# Patient Record
Sex: MALE | Race: WHITE | Employment: OTHER | URBAN - METROPOLITAN AREA
[De-identification: names, ages, dates, MRNs, and addresses within clinical notes are randomized per-mention and may not be internally consistent; named-entity substitution may affect disease eponyms.]

---

## 2023-11-15 LAB
ALANINE AMINOTRANSFERASE (SGPT) (U/L) IN SER/PLAS EXTERNAL: 26 U/L
ALBUMIN (G/DL) IN SER/PLAS EXTERNAL: 4.5 G/DL
ALKALINE PHOSPHATASE (U/L) IN SER/PLAS EXTERNAL: 73 U/L
ASPARTATE AMINOTRANSFERASE (SGOT) (U/L) IN SER/PLAS EXTERNAL: 19 U/L
BILIRUBIN TOTAL (MG/DL) IN SER/PLAS EXTERNAL: 0.7 MG/DL
CALCIUM (MG/DL) IN SER/PLAS EXTERNAL: 9.6 MG/DL
CARBON DIOXIDE, TOTAL (MMOL/L) IN SER/PLAS EXTERNAL: 28 MMOL/L
CHLORIDE (MMOL/L) IN SER/PLAS EXTERNAL: 103 MMOL/L
CHOLESTEROL (MG/DL) IN SER/PLAS EXTERNAL: 256 MG/DL
CHOLESTEROL IN HDL (MG/DL) IN SER/PLAS EXTERNAL: 73 MG/DL
CHOLESTEROL IN LDL (MG/DL) IN SERUM OR PLASMA BY CALCULATION EXTERNAL: 163 MG/DL
CHOLESTEROL/HDL RATIO EXTERNAL: 3.5
CREATININE (MG/DL) IN SER/PLAS EXTERNAL: 1.07 MG/DL
GLOMERULAR FILTRATION RATE ML/MIN/1.73 SQ M.PREDICTED EXTERNAL: 75 ML/MIN/1.73M*2
GLUCOSE (MG/DL) IN SER/PLAS EXTERNAL: 95 MG/DL
NON HDL CHOLESTEROL EXTERNAL: 183 MG/DL
POTASSIUM (MMOL/L) IN SER/PLAS EXTERMA;: 4 MMOL/L
PROSTATE SPECIFIC AG (NG/ML) IN SER/PLAS EXTERNAL: 2.26 NG/ML
PROTEIN TOTAL EXTERNAL: 6.7 G/DL
SODIUM (MMOL/L) IN SER/PLAS EXTERNAL: 140 MMOL/L
THYROTROPIN (MIU/L) IN SER/PLAS BY DETECTION LIMIT <= 0.05 MIU/L EXTERNAL: 2.15 MIU/L
TRIGLYCERIDE (MG/DL) IN SER/PLAS EXTERNAL: 92 MG/DL
UREA NITROGEN (MG/DL) IN SER/PLAS EXTERNAL: 15 MG/DL

## 2023-11-27 PROBLEM — G56.20 ULNAR NEUROPATHY AT ELBOW: Status: ACTIVE | Noted: 2023-11-27

## 2023-11-27 PROBLEM — N20.0 NEPHROLITHIASIS: Status: ACTIVE | Noted: 2023-11-27

## 2023-11-27 PROBLEM — E78.5 HYPERLIPIDEMIA: Status: ACTIVE | Noted: 2023-11-27

## 2023-11-27 PROBLEM — H61.20 IMPACTED CERUMEN: Status: ACTIVE | Noted: 2023-11-27

## 2023-11-27 PROBLEM — H90.3 ASYMMETRIC SNHL (SENSORINEURAL HEARING LOSS): Status: ACTIVE | Noted: 2023-11-27

## 2023-11-27 PROBLEM — H93.12 TINNITUS, LEFT: Status: ACTIVE | Noted: 2023-11-27

## 2023-11-27 PROBLEM — K21.9 GASTROESOPHAGEAL REFLUX DISEASE: Status: ACTIVE | Noted: 2023-11-27

## 2023-11-27 PROBLEM — M72.0 DUPUYTREN CONTRACTURE: Status: ACTIVE | Noted: 2023-11-27

## 2023-11-27 PROBLEM — Z86.69 HISTORY OF MIGRAINE: Status: ACTIVE | Noted: 2023-11-27

## 2023-11-27 PROBLEM — L71.9 ROSACEA: Status: ACTIVE | Noted: 2023-11-27

## 2023-11-27 PROBLEM — R25.1 TREMOR: Status: ACTIVE | Noted: 2023-11-27

## 2023-11-27 PROBLEM — N40.0 BENIGN PROSTATIC HYPERPLASIA: Status: ACTIVE | Noted: 2023-11-27

## 2023-11-27 PROBLEM — H61.23 BILATERAL IMPACTED CERUMEN: Status: ACTIVE | Noted: 2023-11-27

## 2023-11-27 PROBLEM — G47.00 INSOMNIA: Status: ACTIVE | Noted: 2023-11-27

## 2023-11-27 PROBLEM — R91.8 LUNG NODULES: Status: ACTIVE | Noted: 2023-11-27

## 2023-11-27 PROBLEM — R03.0 PREHYPERTENSION: Status: ACTIVE | Noted: 2023-11-27

## 2023-11-27 PROBLEM — B00.9 HSV (HERPES SIMPLEX VIRUS) INFECTION: Status: ACTIVE | Noted: 2023-11-27

## 2023-11-27 RX ORDER — SOD SULF/POT CHLORIDE/MAG SULF 1.479 G
TABLET ORAL
COMMUNITY
Start: 2023-01-05 | End: 2023-12-05 | Stop reason: ALTCHOICE

## 2023-11-27 RX ORDER — NEOMYCIN SULFATE, POLYMYXIN B SULFATE AND DEXAMETHASONE 3.5; 10000; 1 MG/ML; [USP'U]/ML; MG/ML
SUSPENSION/ DROPS OPHTHALMIC
COMMUNITY
Start: 2023-01-06 | End: 2023-12-06 | Stop reason: ALTCHOICE

## 2023-11-27 RX ORDER — VALACYCLOVIR HYDROCHLORIDE 1 G/1
TABLET, FILM COATED ORAL
COMMUNITY
Start: 2014-02-10 | End: 2023-12-06 | Stop reason: SDUPTHER

## 2023-12-01 NOTE — PROGRESS NOTES
Subjective   Patient ID: Jose Stephenson is a 68 y.o. male who presents for No chief complaint on file..  HPI-year-old male is being seen back in the office for recheck of his ears and hearing. Past evaluations have shown evidence for high-frequency hearing loss mostly moderate on the right side in the mid to upper frequency hearing loss on the left ear. There is asymmetry in the 2 areas left lower than right. Hearing has been monitored for any changes in the asymmetry. No imaging studies have been done to date. He does periodically use Debrox for ear cleaning.  He did in September have 1 episode of vertigo in the morning as he rolled out of bed quickly develops instability that was strong leading to some vomiting.  This in the past without recurrences.  There is no evidence for hearing change during the event and has had no problems with that since or before that event.     Objective   Physical Exam  EXAMINATION:     GENERAL TRINITY.EARANCE: Alert, in no acute distress, normal pitch and clarity of voice, well-developed and nourished, cooperative.     HEAD/FACE: Normocephalic, atraumatic, normal facial movements and strength, no no tenderness to palpation, no lesions noted.     SKIN: Normal turgor, no raised or ulcerative lesions, warm and dry to palpation.     EYES: Extraocular motions intact, no nystagmus noted, pupils equal and reactive to light and accommodation, no conjunctivitis.     EARS: Both ears--external ear anatomy is normal without lesions, auditory canals are patent and without skin abrasions or lesions  ceruminous debris is removed with a wax loop bilaterally.  Hearing is intact to voice, tympanic membranes are intact with no acute inflammation, light reflexes present, no effusions are noted and no mastoid tenderness found to palpation.     NOSE: No external skin lesions are noted, nares are patent, septum is intact and deviated to the left, no signs of intranasal inflammation is noted,, sinuses are  nontender to palpation bilaterally, nasal valve is normal.     OROPHARYNX/ORAL CAVITY: Oropharynx is not inflamed and is without lesions, mucosa of the oral cavity is intact and without lesions, tongue is midline and mobile, no acute dental disease is noted, TMJs are mobile however there is a need for lateral movement of his jaw to overcome a left TMJ obstruction., that is post tonsillectomy     NECK: No lymphadenopathy is palpated, carotid pulses are intact, neck is supple with full range of motion, no thyroid abnormalities are noted, trachea is midline, no neck masses are palpated.     LYMPHATICS: No cervical adenopathy or supraclavicular adenopathy is palpated.     NEUROLOGIC/PSYCH; alert and oriented, cranial nerves are grossly intact, gait is without falling, no motor deficits are noted      His audiogram today continues to show on the left-hand side a mid to high-frequency hearing loss mild to still moderately severe at 3 to 4000 Hz back to moderate in the upper frequencies of sound slight change noted.  Discrimination score was still 96% at a 60 dB presentation level increased volume needed to improve discrimination.  The right side continues also show a mid moderate hearing loss mild at 8000 Hz with 96% discrimination at 60 dB as well.  Subtle changes were noted.  Patient ID: Jose Stephenson is a 68 y.o. male.    Ear cerumen removal    Date/Time: 12/5/2023 8:46 AM    Performed by: Shiv Blair DMD, MD  Authorized by: Shiv Blair DMD, MD    Consent:     Consent obtained:  Verbal    Consent given by:  Patient    Risks, benefits, and alternatives were discussed: yes      Risks discussed:  Incomplete removal, pain, infection, dizziness and bleeding    Alternatives discussed:  Observation  Comments:        Procedure Ear Cleaning    Consent:  The planned procedure including the risks as well as alternatives of treatments were discussed and verbal consent obtained.    Procedure: Using otoscopic  techniques, cerumen is removed with a wax loop from both ear canals.    Findings:  The external auditory canals are without inflammation or lesions and both tympanic members are normal in appearance with no evidence for middle ear effusion or signs of infection. No mastoid tenderness is noted. The patient tolerated the procedure well.    Assessment/Plan   Problem List Items Addressed This Visit             ICD-10-CM    Asymmetric SNHL (sensorineural hearing loss) - Primary H90.3    Bilateral impacted cerumen H61.23    Gastroesophageal reflux disease K21.9    Tinnitus, left H93.12    History of migraine Z86.69     I discussed the findings with the patient. Do to the history of cerumen impactions, avoidance of Q tip use and water contamination is advised. Periodic check ups in the office or with the PCP are advised and if recurrent obstructions are noted a scheduled cleaning schedule will be maintained. Ear pain, otorhea, changes in hearing should be reported to the office. For some the use of debrox or baby oil can be helpful as long as sheron TM is intact and not perforated.    I discussed the present hearing test findings with the patient. Since the last test there has been no significant change in the hearing of individual frequencies sound. Discrimination ability remains basically unchanged. It would be advised that a yearly audiogram be done unless symptoms develop in regards to progressive loss, new onset vertigo, or changes regarding tinnitus. Avoidance of loud noise without ear protection is advised. Rehabilitation using hearing aids is advised.  If he wishes the services of the audiologist here he can contact the office and a hearing evaluation will be set up.  If he goes elsewhere he should also plan on a yearly exam to check on hearing along with follow-up with his chosen audiologist.

## 2023-12-05 ENCOUNTER — CLINICAL SUPPORT (OUTPATIENT)
Dept: AUDIOLOGY | Facility: CLINIC | Age: 68
End: 2023-12-05
Payer: COMMERCIAL

## 2023-12-05 ENCOUNTER — OFFICE VISIT (OUTPATIENT)
Dept: OTOLARYNGOLOGY | Facility: CLINIC | Age: 68
End: 2023-12-05
Payer: COMMERCIAL

## 2023-12-05 VITALS — BODY MASS INDEX: 24.5 KG/M2 | WEIGHT: 175 LBS | HEIGHT: 71 IN

## 2023-12-05 DIAGNOSIS — H90.3 SENSORINEURAL HEARING LOSS, BILATERAL: Primary | ICD-10-CM

## 2023-12-05 DIAGNOSIS — H93.12 TINNITUS, LEFT: ICD-10-CM

## 2023-12-05 DIAGNOSIS — H61.23 BILATERAL IMPACTED CERUMEN: ICD-10-CM

## 2023-12-05 DIAGNOSIS — H93.13 TINNITUS, BILATERAL: ICD-10-CM

## 2023-12-05 DIAGNOSIS — K21.9 GASTROESOPHAGEAL REFLUX DISEASE WITHOUT ESOPHAGITIS: ICD-10-CM

## 2023-12-05 DIAGNOSIS — H90.3 ASYMMETRIC SNHL (SENSORINEURAL HEARING LOSS): Primary | ICD-10-CM

## 2023-12-05 DIAGNOSIS — Z86.69 HISTORY OF MIGRAINE: ICD-10-CM

## 2023-12-05 PROCEDURE — 92557 COMPREHENSIVE HEARING TEST: CPT | Performed by: AUDIOLOGIST

## 2023-12-05 PROCEDURE — 69210 REMOVE IMPACTED EAR WAX UNI: CPT | Performed by: OTOLARYNGOLOGY

## 2023-12-05 PROCEDURE — 1159F MED LIST DOCD IN RCRD: CPT | Performed by: OTOLARYNGOLOGY

## 2023-12-05 PROCEDURE — 1126F AMNT PAIN NOTED NONE PRSNT: CPT | Performed by: OTOLARYNGOLOGY

## 2023-12-05 PROCEDURE — 1036F TOBACCO NON-USER: CPT | Performed by: OTOLARYNGOLOGY

## 2023-12-05 PROCEDURE — 1160F RVW MEDS BY RX/DR IN RCRD: CPT | Performed by: OTOLARYNGOLOGY

## 2023-12-05 PROCEDURE — 99214 OFFICE O/P EST MOD 30 MIN: CPT | Performed by: OTOLARYNGOLOGY

## 2023-12-05 RX ORDER — TRETINOIN 0.25 MG/G
CREAM TOPICAL
COMMUNITY
Start: 2022-03-07

## 2023-12-05 RX ORDER — METRONIDAZOLE 7.5 MG/G
GEL TOPICAL
COMMUNITY
Start: 2023-09-20

## 2023-12-05 NOTE — PROGRESS NOTES
COMPREHENSIVE AUDIOMETRIC EVALUATION      Name:  Jose Stephenson  :  1955  Age:  68 y.o.  Date of Evaluation:  23   Referring Provider:  Dr. Blair     History:  Mr. Stephenson was seen today for an evaluation of hearing.  Please note, patient has a known sensorineural hearing loss, bilaterally, most recently documented 2022. Patient reported increased difficulty with higher pitches, women's voices, and in the presence of background noise. Patient additionally reported tinnitus, bilaterally, greater in the left ear than the right ear.  When asked, patient denied otalgia and aural fullness.    See audiometric evaluation at end of this report or scanned under media tab    OTOSCOPY:       Right Ear: Clear canal       Left Ear: Clear canal    AUDIOMETRIC EVALUATION (Phones):       Right Ear: Within normal limits through 1500 Hz sloping to Moderately severe, Sensorineural hearing loss                 Left Ear: Within normal limits through 1000 Hz sloping to Moderately severe, Sensorineural hearing loss         NOTE: Hearing sensitivity essentially consistent with previous audiometric evaluation, however slight changes in hearing sensitivity over time.      Test technique:  Standard Audiometry  Reliability:   good    SPEECH RECOGNITION THRESHOLD:       Right Ear:  5 dBHL in good agreement with PTA       Left Ear:  15 dBHL in good agreement with PTA    WORD RECOGNITION:       Right Ear:  excellent (96%) at elevated presentation level       Left Ear:  excellent (96%) at normal presentation level    NOTE: R WRS repeated for best patient performance (84% at 50 dB HL)    DISCUSSION:   Discussed results and recommendations with patient.  Questions were addressed and the patient was encouraged to contact our department should concerns arise.    RECOMMENDATIONS:  -Recommend patient move forward with hearing aids, bilaterally, for increased signal to noise ratio, clarity of speech, and as possible tinnitus  management.  -Recommend patient return as medically indicated or should concerns for changes in hearing sensitivity arise.    Iona Rudd, CCC-A     Appt: 8:30 - 9:00 AM

## 2023-12-06 ENCOUNTER — OFFICE VISIT (OUTPATIENT)
Dept: PRIMARY CARE | Facility: CLINIC | Age: 68
End: 2023-12-06
Payer: COMMERCIAL

## 2023-12-06 VITALS
DIASTOLIC BLOOD PRESSURE: 80 MMHG | WEIGHT: 182 LBS | TEMPERATURE: 97.1 F | BODY MASS INDEX: 25.48 KG/M2 | HEART RATE: 62 BPM | OXYGEN SATURATION: 100 % | HEIGHT: 71 IN | SYSTOLIC BLOOD PRESSURE: 130 MMHG

## 2023-12-06 DIAGNOSIS — K21.9 GASTROESOPHAGEAL REFLUX DISEASE WITHOUT ESOPHAGITIS: ICD-10-CM

## 2023-12-06 DIAGNOSIS — L71.9 ROSACEA: ICD-10-CM

## 2023-12-06 DIAGNOSIS — G47.00 INSOMNIA, UNSPECIFIED TYPE: ICD-10-CM

## 2023-12-06 DIAGNOSIS — Z13.29 THYROID DISORDER SCREEN: ICD-10-CM

## 2023-12-06 DIAGNOSIS — E78.5 HYPERLIPIDEMIA, UNSPECIFIED HYPERLIPIDEMIA TYPE: ICD-10-CM

## 2023-12-06 DIAGNOSIS — Z12.5 SCREENING PSA (PROSTATE SPECIFIC ANTIGEN): ICD-10-CM

## 2023-12-06 DIAGNOSIS — Z00.00 WELLNESS EXAMINATION: Primary | ICD-10-CM

## 2023-12-06 DIAGNOSIS — B00.9 HSV (HERPES SIMPLEX VIRUS) INFECTION: ICD-10-CM

## 2023-12-06 DIAGNOSIS — R03.0 PREHYPERTENSION: ICD-10-CM

## 2023-12-06 DIAGNOSIS — N40.0 BENIGN PROSTATIC HYPERPLASIA, UNSPECIFIED WHETHER LOWER URINARY TRACT SYMPTOMS PRESENT: ICD-10-CM

## 2023-12-06 DIAGNOSIS — R25.1 TREMOR: ICD-10-CM

## 2023-12-06 PROCEDURE — 99397 PER PM REEVAL EST PAT 65+ YR: CPT | Performed by: NURSE PRACTITIONER

## 2023-12-06 PROCEDURE — 1160F RVW MEDS BY RX/DR IN RCRD: CPT | Performed by: NURSE PRACTITIONER

## 2023-12-06 PROCEDURE — 1126F AMNT PAIN NOTED NONE PRSNT: CPT | Performed by: NURSE PRACTITIONER

## 2023-12-06 PROCEDURE — 1159F MED LIST DOCD IN RCRD: CPT | Performed by: NURSE PRACTITIONER

## 2023-12-06 PROCEDURE — 1036F TOBACCO NON-USER: CPT | Performed by: NURSE PRACTITIONER

## 2023-12-06 RX ORDER — CYCLOSPORINE 0.5 MG/ML
1 EMULSION OPHTHALMIC
COMMUNITY

## 2023-12-06 RX ORDER — VALACYCLOVIR HYDROCHLORIDE 1 G/1
1000 TABLET, FILM COATED ORAL DAILY
Qty: 3 TABLET | Refills: 2 | Status: SHIPPED | OUTPATIENT
Start: 2023-12-06

## 2023-12-06 ASSESSMENT — ENCOUNTER SYMPTOMS
MUSCULOSKELETAL NEGATIVE: 1
TREMORS: 1
HEMATOLOGIC/LYMPHATIC NEGATIVE: 1
SLEEP DISTURBANCE: 1
RESPIRATORY NEGATIVE: 1
CARDIOVASCULAR NEGATIVE: 1
CONSTITUTIONAL NEGATIVE: 1
ALLERGIC/IMMUNOLOGIC NEGATIVE: 1

## 2023-12-06 NOTE — PROGRESS NOTES
"Subjective   Patient ID: Jose Stephenson is a 68 y.o. male who presents for Annual Exam.    HPI   Patient of Dr Nieves here for yearly visit-wellness exam. Last seen on 11/17/2022.  Chronic concerns: GERD, HSV, Hyperlipidemia, Insomnia, BPH, Rosacea, Lung nodule, Tremor  Quit taking statins few years ago d/t reading convinced   Specialist  - Hand surgeon   - Dermatologist-  SCC on right arm.   - Audiology- likely hearing loss increased- hearing aids.  - ENT  - Chiropractor  - Urology- no longer seeing  Labs- [facility] 11/15/2023 copy was faxed to office  NON SMOKER  Exercise 3 x week strength training. Walks 20 minutes twice a week.     Review of Systems   Constitutional: Negative.    HENT: Negative.     Eyes:         Eye exam yearly, Lasik surgery    Respiratory: Negative.     Cardiovascular: Negative.    Gastrointestinal:         Acid reflex ongoing- Gaviscon as needed   Genitourinary: Negative.    Musculoskeletal: Negative.    Allergic/Immunologic: Negative.    Neurological:  Positive for tremors.        Intention tremor in left hand- had EMG completed. Neurologist checked out and benign condition.     Hematological: Negative.    Psychiatric/Behavioral:  Positive for sleep disturbance.      Objective   /80   Pulse 62   Temp 36.2 °C (97.1 °F)   Ht 1.803 m (5' 11\")   Wt 82.6 kg (182 lb)   SpO2 100%   BMI 25.38 kg/m²   Weight up per patient- 177 lbs this morning 12 lbs up over the last year.     Physical Exam  Vitals reviewed.   Constitutional:       Appearance: Normal appearance.   HENT:      Mouth/Throat:      Mouth: Mucous membranes are moist.   Eyes:      Extraocular Movements: Extraocular movements intact.      Pupils: Pupils are equal, round, and reactive to light.   Neck:      Vascular: No carotid bruit.   Cardiovascular:      Rate and Rhythm: Normal rate and regular rhythm.      Heart sounds: Normal heart sounds.   Pulmonary:      Breath sounds: Normal breath sounds.   Abdominal:      General: Bowel " sounds are normal.      Palpations: Abdomen is soft.   Musculoskeletal:         General: Normal range of motion.      Cervical back: Normal range of motion and neck supple.   Skin:     General: Skin is warm.   Neurological:      General: No focal deficit present.   Psychiatric:         Mood and Affect: Mood normal.         Behavior: Behavior normal.         Judgment: Judgment normal.       Assessment/Plan   Health Maintenance  Labs 11/15/2023 ([facility]) reviewed with patient, all questions answered  Tdap/Td- Plans to get High dose at pharmacy  Influenza- Unknown   Prevnar 13/20- Up to date  Shingrix Zoster 12/30/2012- Reports received Sh[facility]rix at [facility]  Colonoscopy 04/17/2023. Endoscopy few years ago, no concerns.   PSA  11/15/2023 (2.26)   Diagnoses and all orders for this visit:  Wellness examination  -     Comprehensive Metabolic Panel; Future  -     CBC; Future  Gastroesophageal reflux disease without esophagitis  -     CBC; Future  Insomnia, unspecified type  Prehypertension  -     Comprehensive Metabolic Panel; Future  -     CBC; Future  Tremor  HSV (herpes simplex virus) infection  Rarely needs to use   - Refilled  valACYclovir (Valtrex) 1 gram tablet; Take 1 tablet (1,000 mg) by mouth once daily.  Rosacea- sees dermatology   Hyperlipidemia, unspecified hyperlipidemia type  -     Lipid Panel; Future  Benign prostatic hyperplasia, unspecified whether lower urinary tract symptoms present  Thyroid disorder screen  -     TSH with reflex to Free T4 if abnormal; Future  Screening PSA (prostate specific antigen)  -     PSA; Future    PLAN: follow up yearly for wellness  Print labs for next appt in one year ([facility])

## 2024-11-18 LAB
ALANINE AMINOTRANSFERASE (SGPT) (U/L) IN SER/PLAS EXTERNAL: 21 U/L
ALBUMIN (G/DL) IN SER/PLAS EXTERNAL: 4.6 G/DL
ALKALINE PHOSPHATASE (U/L) IN SER/PLAS EXTERNAL: 56 U/L
ASPARTATE AMINOTRANSFERASE (SGOT) (U/L) IN SER/PLAS EXTERNAL: 19 U/L
BILIRUBIN TOTAL (MG/DL) IN SER/PLAS EXTERNAL: 0.6 MG/DL
CALCIUM (MG/DL) IN SER/PLAS EXTERNAL: 9.9 MG/DL
CARBON DIOXIDE, TOTAL (MMOL/L) IN SER/PLAS EXTERNAL: 26 MMOL/L
CHLORIDE (MMOL/L) IN SER/PLAS EXTERNAL: 104 MMOL/L
CHOLESTEROL (MG/DL) IN SER/PLAS EXTERNAL: 289 MG/DL
CHOLESTEROL IN HDL (MG/DL) IN SER/PLAS EXTERNAL: 62 MG/DL
CHOLESTEROL IN LDL (MG/DL) IN SERUM OR PLASMA BY CALCULATION EXTERNAL: 206 MG/DL
CHOLESTEROL/HDL RATIO EXTERNAL: 4.7
CREATININE (MG/DL) IN SER/PLAS EXTERNAL: 1.04 MG/DL
ERYTHROCYTE DISTRIBUTION WIDTH (RATIO) BY AUTOMATED COUNT EXTERNAL: 12.6 %
ERYTHROCYTE MEAN CORPUSCULAR HEMOGLOBIN (PG) BY AUTOMATED COUNT EXTERNAL: 31.4 PG
ERYTHROCYTE MEAN CORPUSCULAR HGB CONCENTRATION (G/DL) BY AUTOMATED EXT: 33.8 G/DL
ERYTHROCYTE MEAN CORPUSCULAR VOLUME (FL) BY AUTOMATED COUNT EXTERNAL: 93 FL
ERYTHROCYTES (10*6/UL) IN BLOOD BY AUTOMATED COUNT EXTERNAL: 4.77 X10*6/UL
GLOMERULAR FILTRATION RATE ML/MIN/1.73 SQ M.PREDICTED EXTERNAL: 77 ML/MIN/1.73M*2
GLUCOSE (MG/DL) IN SER/PLAS EXTERNAL: 102 MG/DL
HEMATOCRIT (%) IN BLOOD BY AUTOMATED COUNT EXTERNAL: 44.4 %
HEMOGLOBIN (G/DL) IN BLOOD EXTERNAL: 15 G/DL
LEUKOCYTES (10*3/UL) IN BLOOD BY AUTOMATED COUNT EXTERNAL: 6.4 X10*3/UL
NON HDL CHOLESTEROL EXTERNAL: 227 MG/DL
PLATELET MEAN VOLUME (FL) IN BLOOD BY AUTOMATED COUNT EXTERNAL: 9.3 FL
PLATELETS (10*3/UL) IN BLOOD AUTOMATED COUNT EXTERNAL: 252 X10*3/UL
POTASSIUM (MMOL/L) IN SER/PLAS EXTERMA;: 4.3 MMOL/L
PROSTATE SPECIFIC AG (NG/ML) IN SER/PLAS EXTERNAL: 2.84 NG/ML
PROTEIN TOTAL EXTERNAL: 6.8 G/DL
SODIUM (MMOL/L) IN SER/PLAS EXTERNAL: 139 MMOL/L
THYROTROPIN (MIU/L) IN SER/PLAS BY DETECTION LIMIT <= 0.05 MIU/L EXTERNAL: 2.45 MIU/L
TRIGLYCERIDE (MG/DL) IN SER/PLAS EXTERNAL: 90 MG/DL
UREA NITROGEN (MG/DL) IN SER/PLAS EXTERNAL: 13 MG/DL

## 2024-12-06 NOTE — PROGRESS NOTES
Subjective   Patient ID: Jose Stephenson is a 69 y.o. male who presents for No chief complaint on file..  HPI  This 69-year-old male is being seen today for yearly recheck of his ears and hearing.  He has had some mild asymmetry between the 2 areas which has been monitored over time.  He generally has no difficulties with vertigo although did have 1 episode in 2023 that by his description it appeared to be positionally induced..  There is been no subjective changes to report or difficulties with bothersome tinnitus.  His previous audiogram had revealed evidence for normal hearing up to 1500 Hz then a hearing loss from 2000 8000 Hz is noted bilaterally mild to moderate.  He did obtain hearing aids from the [facility] and has found them helpful in regards to conversation though he has noted some self monitoring of his voice more.  Tinnitus seems to be less appreciated especially while wearing his aids.  Review of Systems   A 12 point ROS  has been reviewed and are negative for complaint except for what is stated in the history of present illness and /or for past medical history as noted in the EMR.    Past Medical History:   Diagnosis Date    Abnormal levels of other serum enzymes 11/11/2021    Abnormal liver enzymes    Benign prostatic hyperplasia without lower urinary tract symptoms     BPH (benign prostatic hyperplasia)    Elevated prostate specific antigen (PSA) 07/17/2017    Abnormal PSA    Encounter for follow-up examination after completed treatment for conditions other than malignant neoplasm 12/14/2018    Hospital discharge follow-up    Gastro-esophageal reflux disease without esophagitis 11/11/2021    Gastroesophageal reflux disease    Heart murmur     HL (hearing loss)     Other allergy status, other than to drugs and biological substances     Environmental allergies    Other chest pain 12/14/2018    Muscular chest pain    Other fatigue 08/26/2019    Other fatigue    Other specified disease of  esophagus 01/28/2019    Esophageal thickening    Personal history of other diseases of the nervous system and sense organs     History of migraine    Personal history of other diseases of urinary system 10/05/2015    History of hematuria    Personal history of other drug therapy 10/10/2016    History of influenza vaccination    Unspecified open wound of unspecified finger without damage to nail, initial encounter 07/16/2018    Finger wound, simple, open          Current Outpatient Medications:     hypromellose (Vista Gonio) 2.5 % ophthalmic solution, 1 drop if needed for dry eyes., Disp: , Rfl:     metroNIDAZOLE (Metrogel) 0.75 % gel, Apply to face twice a day when flared, then once a day for maintenance., Disp: , Rfl:     tretinoin (Retin-A) 0.025 % cream, apply a pea size amount to whole face NIGHTLY, Disp: , Rfl:     valACYclovir (Valtrex) 1 gram tablet, Take 1 tablet (1,000 mg) by mouth once daily. (Patient taking differently: Take 1 tablet (1,000 mg) by mouth once daily. PRN), Disp: 3 tablet, Rfl: 2     Social History     Tobacco Use    Smoking status: Never    Smokeless tobacco: Never   Substance Use Topics    Alcohol use: Yes     Alcohol/week: 12.0 standard drinks of alcohol     Types: 10 Glasses of wine, 2 Cans of beer per week       Allergies   Allergen Reactions    Hydrocodone-Acetaminophen Other     Vomiting/ Nausea       There were no vitals taken for this visit.    Objective   Physical Exam  EXAMINATION:     GENERAL TRINITY.EARANCE: Alert, in no acute distress, normal pitch and clarity of voice, well-developed and nourished, cooperative.     HEAD/FACE: Normocephalic, atraumatic, normal facial movements and strength, no no tenderness to palpation, no lesions noted.     SKIN: Normal turgor, no raised or ulcerative lesions, warm and dry to palpation.     EYES: Extraocular motions intact, no nystagmus noted, pupils equal and reactive to light and accommodation, no conjunctivitis.     EARS: Both ears--external  ear anatomy is normal without lesions, auditory canals are patent and without skin abrasions or lesions  ceruminous debris is removed with a wax loop from the left ear.  Hearing is intact to voice, tympanic membranes are intact with no acute inflammation, light reflexes present, no effusions are noted and no mastoid tenderness found to palpation.     NOSE: No external skin lesions are noted, nares are patent, septum is intact and deviated to the left, no signs of intranasal inflammation is noted,, sinuses are nontender to palpation bilaterally, nasal valve is normal.     OROPHARYNX/ORAL CAVITY: Oropharynx is not inflamed and is without lesions, mucosa of the oral cavity is intact and without lesions, tongue is midline and mobile, no acute dental disease is noted, TMJs are mobile however there is a need for lateral movement of his jaw to overcome a left TMJ obstruction., that is post tonsillectomy     NECK: No lymphadenopathy is palpated, carotid pulses are intact, neck is supple with full range of motion, no thyroid abnormalities are noted, trachea is midline, no neck masses are palpated.     LYMPHATICS: No cervical adenopathy or supraclavicular adenopathy is palpated.     NEUROLOGIC/PSYCH; alert and oriented, cranial nerves are grossly intact, gait is without falling, no motor deficits are noted    Patient ID: Jose Stephenson is a 69 y.o. male.    Ear cerumen removal    Date/Time: 12/10/2024 9:20 AM    Performed by: Shiv Blair DMD, MD  Authorized by: Shiv Blair DMD, MD    Consent:     Consent obtained:  Verbal    Consent given by:  Patient    Risks discussed:  Pain and incomplete removal    Alternatives discussed:  No treatment and alternative treatment  Universal protocol:     Procedure explained and questions answered to patient or proxy's satisfaction: yes      Imaging studies available: no      Required blood products, implants, devices, and special equipment available: no      Patient identity  confirmed:  Verbally with patient  Procedure details:     Location:  L ear    Procedure type: curette      Procedure type comment:  Or suction    Procedure outcomes: cerumen removed    Post-procedure details:     Inspection:  No bleeding and ear canal clear    Hearing quality:  Improved    Procedure completion:  Tolerated well, no immediate complications  His audiogram today revealed a symmetric hearing loss mild to moderate from 1500 to 8000 Hz.  There was a change in the right ear at 2000 Hz now being equal to the reading on the left.  Discrimination scores are 92% on the right at 50 dB 80% on the left at 50 dB.  Temp pentagrams were normal     Assessment/Plan   Problem List Items Addressed This Visit             ICD-10-CM    Bilateral impacted cerumen H61.23    Tinnitus, left - Primary H93.12     Other Visit Diagnoses         Codes    Sensorineural hearing loss (SNHL) of both ears     H90.3    Auditory discrimination impairment, left     H93.292          I discussed the present hearing test findings with the patient. Since the last test there has been no significant change in the hearing of individual frequencies sound. Discrimination ability remains basically unchanged. It would be advised that a yearly audiogram be done unless symptoms develop in regards to progressive loss, new onset vertigo, or changes regarding tinnitus. Avoidance of loud noise without ear protection is advised. Rehabilitation using hearing aids is advised.  We discussed BPPV which was likely the cause for his 1 episode of vertigo in 2023.  I made him aware of the type of symptoms that would occur from that as opposed to symptoms of vertigo from other etiologies.  If he has vertigo associated with any muscular weakness headache especially he should present to the ER for evaluation.  He had no recurrences since that 1 event.  He is going to see the audiologist at the [facility] where he obtains his hearing aids for follow-up on them.   A recheck in 1 year is advised.       Shiv Blair DMD, MD 12/10/24 9:22 AM

## 2024-12-09 ENCOUNTER — APPOINTMENT (OUTPATIENT)
Dept: PRIMARY CARE | Facility: CLINIC | Age: 69
End: 2024-12-09
Payer: COMMERCIAL

## 2024-12-09 ENCOUNTER — LAB (OUTPATIENT)
Dept: LAB | Facility: LAB | Age: 69
End: 2024-12-09
Payer: COMMERCIAL

## 2024-12-09 VITALS
OXYGEN SATURATION: 100 % | BODY MASS INDEX: 25.9 KG/M2 | SYSTOLIC BLOOD PRESSURE: 120 MMHG | HEIGHT: 71 IN | WEIGHT: 185 LBS | HEART RATE: 60 BPM | DIASTOLIC BLOOD PRESSURE: 70 MMHG

## 2024-12-09 DIAGNOSIS — R03.0 PREHYPERTENSION: ICD-10-CM

## 2024-12-09 DIAGNOSIS — M72.0 DUPUYTREN CONTRACTURE: ICD-10-CM

## 2024-12-09 DIAGNOSIS — Z00.00 ROUTINE GENERAL MEDICAL EXAMINATION AT HEALTH CARE FACILITY: Primary | ICD-10-CM

## 2024-12-09 DIAGNOSIS — R73.02 IGT (IMPAIRED GLUCOSE TOLERANCE): ICD-10-CM

## 2024-12-09 DIAGNOSIS — K21.9 GASTROESOPHAGEAL REFLUX DISEASE WITHOUT ESOPHAGITIS: ICD-10-CM

## 2024-12-09 DIAGNOSIS — E78.5 HYPERLIPIDEMIA, UNSPECIFIED HYPERLIPIDEMIA TYPE: ICD-10-CM

## 2024-12-09 LAB
EST. AVERAGE GLUCOSE BLD GHB EST-MCNC: 105 MG/DL
HBA1C MFR BLD: 5.3 %

## 2024-12-09 PROCEDURE — 83036 HEMOGLOBIN GLYCOSYLATED A1C: CPT

## 2024-12-09 PROCEDURE — 3008F BODY MASS INDEX DOCD: CPT | Performed by: INTERNAL MEDICINE

## 2024-12-09 PROCEDURE — 1124F ACP DISCUSS-NO DSCNMKR DOCD: CPT | Performed by: INTERNAL MEDICINE

## 2024-12-09 PROCEDURE — 1036F TOBACCO NON-USER: CPT | Performed by: INTERNAL MEDICINE

## 2024-12-09 PROCEDURE — 1160F RVW MEDS BY RX/DR IN RCRD: CPT | Performed by: INTERNAL MEDICINE

## 2024-12-09 PROCEDURE — 83704 LIPOPROTEIN BLD QUAN PART: CPT

## 2024-12-09 PROCEDURE — G0439 PPPS, SUBSEQ VISIT: HCPCS | Performed by: INTERNAL MEDICINE

## 2024-12-09 PROCEDURE — 1159F MED LIST DOCD IN RCRD: CPT | Performed by: INTERNAL MEDICINE

## 2024-12-09 PROCEDURE — 36415 COLL VENOUS BLD VENIPUNCTURE: CPT

## 2024-12-09 PROCEDURE — 99213 OFFICE O/P EST LOW 20 MIN: CPT | Performed by: INTERNAL MEDICINE

## 2024-12-09 NOTE — PROGRESS NOTES
"Subjective   Reason for Visit: Jose Stephenson is an 69 y.o. male here for a Medicare Wellness visit.     Past Medical, Surgical, and Family History reviewed and updated in chart.    Reviewed all medications by prescribing practitioner or clinical pharmacist (such as prescriptions, OTCs, herbal therapies and supplements) and documented in the medical record.    HPI Gerd is worse. Back on coffee. Bending over. Went back on famotidine 20 mg bid.  No dysphagia, no odynophagia.   Has a fear is developing gout. R middle finger dip. Minimal pain w/ running water on.  Had dupytrens surgery. R hand.   Medicare wellness    Patient Care Team:  Mika Nieves MD as PCP - General     Review of Systems  As above    Objective   Vitals:  /70   Pulse 60   Ht 1.803 m (5' 11\")   Wt 83.9 kg (185 lb)   SpO2 100%   BMI 25.80 kg/m²       Physical Exam  Gen nad, affect wnl  Heentt eomfg, face symmetric, ncat  Neck w/o la, tm, bruit  Lungs clear   Cv rrr nl s1, s2  Ext w/o edema, healed dupytren's sight r hand. Normal finger  Neuro grossly nonfocal  Skin good color  Abd soft, nt, w/o hsm  Reviewed labs  Assessment & Plan  Routine general medical examination at health care facility    Orders:    1 Year Follow Up In Primary Care - Wellness Exam; Future    Hyperlipidemia, unspecified hyperlipidemia type    Orders:    Lipoprotein NMR; Future    IGT (impaired glucose tolerance)    Orders:    Hemoglobin A1C; Future    Prehypertension         Gastroesophageal reflux disease without esophagitis         Dupuytren contracture          Nmr/hgbaic now  Fu based on result  Pt considering statin  Rec diet change w/ continued h2 blocker  Fu specialists  Life style modifications for elevated glucose including daily exercise, weight management and carbohydrate limitation discussed.           "

## 2024-12-10 ENCOUNTER — APPOINTMENT (OUTPATIENT)
Dept: OTOLARYNGOLOGY | Facility: CLINIC | Age: 69
End: 2024-12-10
Payer: COMMERCIAL

## 2024-12-10 ENCOUNTER — TELEPHONE (OUTPATIENT)
Dept: PRIMARY CARE | Facility: CLINIC | Age: 69
End: 2024-12-10

## 2024-12-10 ENCOUNTER — APPOINTMENT (OUTPATIENT)
Dept: AUDIOLOGY | Facility: CLINIC | Age: 69
End: 2024-12-10
Payer: COMMERCIAL

## 2024-12-10 DIAGNOSIS — H90.3 SENSORINEURAL HEARING LOSS, BILATERAL: Primary | ICD-10-CM

## 2024-12-10 DIAGNOSIS — H90.3 SENSORINEURAL HEARING LOSS (SNHL) OF BOTH EARS: ICD-10-CM

## 2024-12-10 DIAGNOSIS — H93.12 TINNITUS, LEFT: Primary | ICD-10-CM

## 2024-12-10 DIAGNOSIS — H93.13 TINNITUS, BILATERAL: ICD-10-CM

## 2024-12-10 DIAGNOSIS — H61.22 IMPACTED CERUMEN OF LEFT EAR: ICD-10-CM

## 2024-12-10 DIAGNOSIS — H93.292 AUDITORY DISCRIMINATION IMPAIRMENT, LEFT: ICD-10-CM

## 2024-12-10 PROCEDURE — 92557 COMPREHENSIVE HEARING TEST: CPT | Performed by: AUDIOLOGIST

## 2024-12-10 PROCEDURE — 92567 TYMPANOMETRY: CPT | Performed by: AUDIOLOGIST

## 2024-12-10 PROCEDURE — 69210 REMOVE IMPACTED EAR WAX UNI: CPT | Performed by: OTOLARYNGOLOGY

## 2024-12-10 PROCEDURE — 99214 OFFICE O/P EST MOD 30 MIN: CPT | Performed by: OTOLARYNGOLOGY

## 2024-12-10 PROCEDURE — 1036F TOBACCO NON-USER: CPT | Performed by: OTOLARYNGOLOGY

## 2024-12-10 PROCEDURE — 1159F MED LIST DOCD IN RCRD: CPT | Performed by: OTOLARYNGOLOGY

## 2024-12-10 PROCEDURE — 1160F RVW MEDS BY RX/DR IN RCRD: CPT | Performed by: OTOLARYNGOLOGY

## 2024-12-10 NOTE — PROGRESS NOTES
[facility]  AUDIOMETRIC EVALUATION      Name:  Jose Stephenson   :  1955  Age:  69 y.o.  Date of Evaluation:  12/10/24    HISTORY    Jose Stephenson is seen today at the request of Shiv Blair M.D., CHAYITO., F.A.C.S.  The patient is an established patient monitoring hearing loss progression.    EVALUATION  See scanned audiogram in Media and included at the end of this report.    RESULTS    Otoscopic Evaluation:  Right Ear:  clear   Left Ear:  clear    Tympanometry:   Right Ear:  Type A, consistent with normal eardrum mobility and middle ear pressure   Left Ear:  Type A, consistent with normal eardrum mobility and middle ear pressure    Acoustic reflexes were not completed    Pure Tone Audiometry:    Right Ear:  normal to moderate sensorineural hearing loss  Left Ear:  normal to severe sensorineural hearing loss       Speech Audiometry:    Right Ear:  excellent in quiet at a normal presentation level  Left Ear:  good in quiet at a normal presentation level  Speech reception thresholds were in good agreement with pure tone testing.    DISCUSSION  Results were relayed to Shiv Blair M.D., CHAYITO., F.A.C.S.    APPOINTMENT TIME  8:30am-9:00am     José Bernard  Doctor of Audiology  Senior Audiologist

## 2024-12-11 LAB
CHOLEST SERPL-MCNC: 276 MG/DL (ref 100–199)
HDL SERPL-SCNC: 32.5 UMOL/L
HDLC SERPL-MCNC: 63 MG/DL
LDL SERPL QN: 21.4 NM
LDL SERPL-SCNC: 1813 NMOL/L
LDL SMALL SERPL-SCNC: 545 NMOL/L
LDLC SERPL CALC-MCNC: 195 MG/DL (ref 0–99)
LP-IR SCORE SERPL: 29
TRIGL SERPL-MCNC: 103 MG/DL (ref 0–149)

## 2024-12-16 DIAGNOSIS — Z00.00 WELLNESS EXAMINATION: ICD-10-CM

## 2024-12-16 DIAGNOSIS — Z13.29 THYROID DISORDER SCREEN: ICD-10-CM

## 2024-12-16 DIAGNOSIS — K21.9 GASTROESOPHAGEAL REFLUX DISEASE WITHOUT ESOPHAGITIS: ICD-10-CM

## 2024-12-16 DIAGNOSIS — E78.5 HYPERLIPIDEMIA, UNSPECIFIED HYPERLIPIDEMIA TYPE: Primary | ICD-10-CM

## 2024-12-16 DIAGNOSIS — N40.0 BENIGN PROSTATIC HYPERPLASIA, UNSPECIFIED WHETHER LOWER URINARY TRACT SYMPTOMS PRESENT: ICD-10-CM

## 2024-12-16 RX ORDER — ROSUVASTATIN CALCIUM 5 MG/1
5 TABLET, COATED ORAL DAILY
Qty: 100 TABLET | Refills: 3 | Status: SHIPPED | OUTPATIENT
Start: 2024-12-16 | End: 2026-01-20

## 2025-03-17 LAB
ALBUMIN SERPL-MCNC: 4.4 G/DL (ref 3.6–5.1)
ALP SERPL-CCNC: 58 U/L (ref 35–144)
ALT SERPL-CCNC: 26 U/L (ref 9–46)
ANION GAP SERPL CALCULATED.4IONS-SCNC: 9 MMOL/L (CALC) (ref 7–17)
AST SERPL-CCNC: 23 U/L (ref 10–35)
BILIRUB SERPL-MCNC: 0.7 MG/DL (ref 0.2–1.2)
BUN SERPL-MCNC: 14 MG/DL (ref 7–25)
CALCIUM SERPL-MCNC: 9.5 MG/DL (ref 8.6–10.3)
CHLORIDE SERPL-SCNC: 104 MMOL/L (ref 98–110)
CHOLEST SERPL-MCNC: 224 MG/DL
CHOLEST/HDLC SERPL: 3.4 (CALC)
CO2 SERPL-SCNC: 26 MMOL/L (ref 20–32)
CREAT SERPL-MCNC: 1.02 MG/DL (ref 0.7–1.28)
EGFRCR SERPLBLD CKD-EPI 2021: 78 ML/MIN/1.73M2
GLUCOSE SERPL-MCNC: 98 MG/DL (ref 65–99)
HDLC SERPL-MCNC: 65 MG/DL
LDLC SERPL CALC-MCNC: 140 MG/DL (CALC)
NONHDLC SERPL-MCNC: 159 MG/DL (CALC)
POTASSIUM SERPL-SCNC: 4.1 MMOL/L (ref 3.5–5.3)
PROT SERPL-MCNC: 6.4 G/DL (ref 6.1–8.1)
SODIUM SERPL-SCNC: 139 MMOL/L (ref 135–146)
TRIGL SERPL-MCNC: 88 MG/DL

## 2025-03-24 ENCOUNTER — APPOINTMENT (OUTPATIENT)
Dept: PRIMARY CARE | Facility: CLINIC | Age: 70
End: 2025-03-24
Payer: COMMERCIAL

## 2025-03-24 VITALS
WEIGHT: 178 LBS | DIASTOLIC BLOOD PRESSURE: 80 MMHG | OXYGEN SATURATION: 98 % | BODY MASS INDEX: 24.92 KG/M2 | SYSTOLIC BLOOD PRESSURE: 136 MMHG | HEART RATE: 62 BPM | HEIGHT: 71 IN

## 2025-03-24 DIAGNOSIS — K21.9 GASTROESOPHAGEAL REFLUX DISEASE WITHOUT ESOPHAGITIS: ICD-10-CM

## 2025-03-24 DIAGNOSIS — E78.5 HYPERLIPIDEMIA, UNSPECIFIED HYPERLIPIDEMIA TYPE: Primary | ICD-10-CM

## 2025-03-24 PROCEDURE — 99213 OFFICE O/P EST LOW 20 MIN: CPT | Performed by: INTERNAL MEDICINE

## 2025-03-24 PROCEDURE — 3008F BODY MASS INDEX DOCD: CPT | Performed by: INTERNAL MEDICINE

## 2025-03-24 PROCEDURE — 1159F MED LIST DOCD IN RCRD: CPT | Performed by: INTERNAL MEDICINE

## 2025-03-24 PROCEDURE — 1036F TOBACCO NON-USER: CPT | Performed by: INTERNAL MEDICINE

## 2025-03-24 NOTE — PROGRESS NOTES
"Subjective   Patient ID: Jose Stephenson is a 70 y.o. male who presents for Follow-up.    HPI was off statin for a month w/ 4 weeks of covid. Now better  Fu hyperlipidemia, gerd    Review of Systems  As above    Objective   /80   Pulse 62   Ht 1.803 m (5' 11\")   Wt 80.7 kg (178 lb)   SpO2 98%   BMI 24.83 kg/m²     Physical Exam  Gen nad, affect wnl  Heentt eomfg, face symmetric, ncat  Neck w/o la, tm, bruit  Lungs clear   Cv rrr nl s1, s2  Ext w/o edema  Neuro grossly nonfocal  Skin good color    Assessment/Plan   Diagnoses and all orders for this visit:  Hyperlipidemia, unspecified hyperlipidemia type  Gastroesophageal reflux disease without esophagitis     Fu p.e. 12/25 w/ labs    "

## 2025-08-06 ENCOUNTER — TELEPHONE (OUTPATIENT)
Dept: PRIMARY CARE | Facility: CLINIC | Age: 70
End: 2025-08-06
Payer: COMMERCIAL

## 2025-08-13 DIAGNOSIS — R03.0 PREHYPERTENSION: ICD-10-CM

## 2025-08-13 DIAGNOSIS — Z12.5 SCREENING PSA (PROSTATE SPECIFIC ANTIGEN): ICD-10-CM

## 2025-08-13 DIAGNOSIS — Z00.00 WELLNESS EXAMINATION: Primary | ICD-10-CM

## 2025-08-13 DIAGNOSIS — N20.0 NEPHROLITHIASIS: ICD-10-CM

## 2025-08-13 DIAGNOSIS — E78.5 HYPERLIPIDEMIA, UNSPECIFIED HYPERLIPIDEMIA TYPE: ICD-10-CM

## 2025-12-10 ENCOUNTER — APPOINTMENT (OUTPATIENT)
Dept: PRIMARY CARE | Facility: CLINIC | Age: 70
End: 2025-12-10
Payer: COMMERCIAL

## 2025-12-11 ENCOUNTER — APPOINTMENT (OUTPATIENT)
Dept: OTOLARYNGOLOGY | Facility: CLINIC | Age: 70
End: 2025-12-11
Payer: COMMERCIAL

## 2025-12-11 ENCOUNTER — APPOINTMENT (OUTPATIENT)
Dept: AUDIOLOGY | Facility: CLINIC | Age: 70
End: 2025-12-11
Payer: COMMERCIAL